# Patient Record
Sex: MALE | Race: WHITE
[De-identification: names, ages, dates, MRNs, and addresses within clinical notes are randomized per-mention and may not be internally consistent; named-entity substitution may affect disease eponyms.]

---

## 2023-05-28 ENCOUNTER — HOSPITAL ENCOUNTER (EMERGENCY)
Dept: HOSPITAL 75 - ER | Age: 23
Discharge: HOME | End: 2023-05-28
Payer: COMMERCIAL

## 2023-05-28 VITALS — SYSTOLIC BLOOD PRESSURE: 123 MMHG | DIASTOLIC BLOOD PRESSURE: 82 MMHG

## 2023-05-28 DIAGNOSIS — Z28.311: ICD-10-CM

## 2023-05-28 DIAGNOSIS — R06.02: ICD-10-CM

## 2023-05-28 DIAGNOSIS — K08.89: Primary | ICD-10-CM

## 2023-05-28 DIAGNOSIS — Z20.822: ICD-10-CM

## 2023-05-28 DIAGNOSIS — R51.9: ICD-10-CM

## 2023-05-28 LAB
ALBUMIN SERPL-MCNC: 4.5 GM/DL (ref 3.2–4.5)
ALP SERPL-CCNC: 69 U/L (ref 40–136)
ALT SERPL-CCNC: 23 U/L (ref 0–55)
AMORPH SED URNS QL MICRO: (no result) /LPF
APTT PPP: YELLOW S
BACTERIA #/AREA URNS HPF: NEGATIVE /HPF
BASOPHILS # BLD AUTO: 0.1 10^3/UL (ref 0–0.1)
BASOPHILS NFR BLD AUTO: 1 % (ref 0–10)
BILIRUB SERPL-MCNC: 0.5 MG/DL (ref 0.1–1)
BILIRUB UR QL STRIP: NEGATIVE
BUN/CREAT SERPL: 11
CALCIUM SERPL-MCNC: 9.9 MG/DL (ref 8.5–10.1)
CHLORIDE SERPL-SCNC: 105 MMOL/L (ref 98–107)
CO2 SERPL-SCNC: 25 MMOL/L (ref 21–32)
CREAT SERPL-MCNC: 1 MG/DL (ref 0.6–1.3)
EOSINOPHIL # BLD AUTO: 0.2 10^3/UL (ref 0–0.3)
EOSINOPHIL NFR BLD AUTO: 2 % (ref 0–10)
FIBRINOGEN PPP-MCNC: CLEAR MG/DL
GFR SERPLBLD BASED ON 1.73 SQ M-ARVRAT: 108 ML/MIN
GLUCOSE SERPL-MCNC: 95 MG/DL (ref 70–105)
GLUCOSE UR STRIP-MCNC: NEGATIVE MG/DL
HCT VFR BLD CALC: 43 % (ref 35–52)
HGB BLD-MCNC: 15 G/DL (ref 11.5–16)
KETONES UR QL STRIP: (no result)
LEUKOCYTE ESTERASE UR QL STRIP: NEGATIVE
LIPASE SERPL-CCNC: 36 U/L (ref 8–78)
LYMPHOCYTES # BLD AUTO: 3.1 10^3/UL (ref 1–4)
LYMPHOCYTES NFR BLD AUTO: 23 % (ref 12–44)
MANUAL DIFFERENTIAL PERFORMED BLD QL: NO
MCH RBC QN AUTO: 31 PG (ref 25–34)
MCHC RBC AUTO-ENTMCNC: 35 G/DL (ref 32–36)
MCV RBC AUTO: 89 FL (ref 80–99)
MONOCYTES # BLD AUTO: 0.7 10^3/UL (ref 0–1)
MONOCYTES NFR BLD AUTO: 5 % (ref 0–12)
NEUTROPHILS # BLD AUTO: 9.1 10^3/UL (ref 1.8–7.8)
NEUTROPHILS NFR BLD AUTO: 69 % (ref 42–75)
NITRITE UR QL STRIP: NEGATIVE
PH UR STRIP: 7 [PH] (ref 5–9)
PLATELET # BLD: 345 10^3/UL (ref 130–400)
PMV BLD AUTO: 10.2 FL (ref 9–12.2)
POTASSIUM SERPL-SCNC: 4.1 MMOL/L (ref 3.6–5)
PROT SERPL-MCNC: 7.7 GM/DL (ref 6.4–8.2)
PROT UR QL STRIP: (no result)
RBC #/AREA URNS HPF: (no result) /HPF
SODIUM SERPL-SCNC: 142 MMOL/L (ref 135–145)
SP GR UR STRIP: 1.02 (ref 1.02–1.02)
WBC # BLD AUTO: 13.3 10^3/UL (ref 4.3–11)
WBC #/AREA URNS HPF: (no result) /HPF

## 2023-05-28 PROCEDURE — 83690 ASSAY OF LIPASE: CPT

## 2023-05-28 PROCEDURE — 87636 SARSCOV2 & INF A&B AMP PRB: CPT

## 2023-05-28 PROCEDURE — 85025 COMPLETE CBC W/AUTO DIFF WBC: CPT

## 2023-05-28 PROCEDURE — 81000 URINALYSIS NONAUTO W/SCOPE: CPT

## 2023-05-28 PROCEDURE — 80053 COMPREHEN METABOLIC PANEL: CPT

## 2023-05-28 PROCEDURE — 86141 C-REACTIVE PROTEIN HS: CPT

## 2023-05-28 PROCEDURE — 36415 COLL VENOUS BLD VENIPUNCTURE: CPT

## 2023-05-28 NOTE — ED GENERAL
General


Chief Complaint:  General Problems/Pain


Stated Complaint:  FEVER


Nursing Triage Note:  


PT AMB TO [facility] 5 ACCOMPAINED BY EMS WITH CC OF FEVER, DIZZINESS AND INDGESTION X 1




DAY. PT UNK HOW HIGH FEVER HAS BEEN. PT DENIES ABD PAIN NAUSEA AND VOMITING.


Source of Information:  Patient


Exam Limitations:  No Limitations





History of Present Illness


Date Seen by Provider:  May 28, 2023


Time Seen by Provider:  20:38


Initial Comments


Patient is a 23-year-old male who presents to ED with feeling warm, dizzy, 

indigestion, facial warmness.  Symptoms over the past few days of feeling dizzy.

 States he feels off balance.  Denies room spinning, ear pain, hearing loss.  

States 1 hour ago he started feeling short of breath.  He states his left and 

right face feels warm.  Denies of any visual changes, unilateral muscle weakness

or sensory changes.  Reports some indigestion type symptoms secondary to this 

warmth on his face.  He is concerned that his wisdom teeth are infected.  

Patient has seen a dentist and they are recommending pulling his wisdom teeth in

4 months.  Denies headache, chest pain, cough, abdominal pain.  Reports some 

diarrhea since yesterday.  Denies any vomiting.  No current abdominal pain, 

dysuria, hematuria.





Allergies and Home Medications


Allergies


Coded Allergies:  


     No Known Drug Allergies (Unverified , 5/28/23)





Patient Home Medication List


Home Medication List Reviewed:  Yes


Amoxicillin/Potassium Clav (Amox Tr-K Clv 875-125 mg Tab) 875 Mg-125 Mg Tablet, 

1 EACH PO BID


   Prescribed by: MAIRA BURKETT on 5/28/23 7236





Review of Systems


Review of Systems


Constitutional:  No chills, No diaphoresis; dizziness, fever, malaise, weakness


EENTM:  No ear pain, No blurred vision, No double vision


Respiratory:  No cough, No dyspnea on exertion; short of breath


Cardiovascular:  No chest pain, No edema


Gastrointestinal:  No abdominal pain, No constipation, No diarrhea; nausea, 

vomiting


Genitourinary:  No decreased output, No discharge


Musculoskeletal:  No back pain, No joint pain





All Other Systems Reviewed


Negative Unless Noted:  Yes





Past Medical-Social-Family Hx


Patient Social History


Tobacco Use?:  No


Substance use?:  No


Alcohol Use?:  Yes


Alcohol Frequency:  Once in a while


Pt feels they are or have been:  No





Physical Exam


Vital Signs





Vital Signs - First Documented








 5/28/23





 20:12


 


Temp 37.4


 


Pulse 96


 


Resp 18


 


B/P (MAP) 137/74 (95)


 


Pulse Ox 100





Capillary Refill : Less Than 3 Seconds


Height, Weight, BMI


Height: '"


Weight: lbs. oz. kg;  BMI


Method:


General Appearance:  No Apparent Distress, WD/WN


Eyes:  Bilateral Eye Normal Inspection, Bilateral Eye PERRL, Bilateral Eye EOMI


HEENT:  PERRL/EOMI, TMs Normal, Normal ENT Inspection, Pharynx Normal, Other 

(Bilateral upper wisdom teeth noted with surrounding gum swelling and redness.  

No function of mass)


Neck:  Full Range of Motion, Normal Inspection, Non Tender


Respiratory:  Chest Non Tender, Lungs Clear, Normal Breath Sounds, No Accessory 

Muscle Use


Cardiovascular:  Regular Rate, Rhythm, No Edema, No Gallop, No JVD


Gastrointestinal:  Normal Bowel Sounds, No Organomegaly, No Pulsatile Mass


Rectal:  Normal Exam


Back:  Normal Inspection, No CVA Tenderness, No Vertebral Tenderness


Extremity:  Normal Capillary Refill, Normal Inspection, Normal Range of Motion, 

Non Tender


Neurologic/Psychiatric:  Alert, Oriented x3, No Motor/Sensory Deficits, Normal 

Mood/Affect, CNs II-XII Norm as Tested


Skin:  Normal Color, Warm/Dry





Progress/Results/Core Measures


Suspected Sepsis


SIRS


Temperature: 


Pulse: 96 


Respiratory Rate: 18


 


Laboratory Tests


5/28/23 20:49: White Blood Count 13.3H


Blood Pressure 137 /74 


Mean: 95


 





Laboratory Tests


5/28/23 20:49: 


Creatinine 1.00, Platelet Count 345, Total Bilirubin 0.5








Results/Orders


Lab Results





Laboratory Tests








Test


 5/28/23


20:40 5/28/23


20:49 5/28/23


20:54 Range/Units


 


 


Urine Color YELLOW     


 


Urine Clarity CLEAR     


 


Urine pH 7.0    5-9  


 


Urine Specific Gravity 1.020    1.016-1.022  


 


Urine Protein 1+ H   NEGATIVE  


 


Urine Glucose (UA) NEGATIVE    NEGATIVE  


 


Urine Ketones TRACE H   NEGATIVE  


 


Urine Nitrite NEGATIVE    NEGATIVE  


 


Urine Bilirubin NEGATIVE    NEGATIVE  


 


Urine Urobilinogen 1.0    < = 1.0  MG/DL


 


Urine Leukocyte Esterase NEGATIVE    NEGATIVE  


 


Urine RBC (Auto) NEGATIVE    NEGATIVE  


 


Urine RBC NONE     /HPF


 


Urine WBC NONE     /HPF


 


Urine Crystals PRESENT H    /LPF


 


Urine Amorphous Sediment


 MOD MAEGAN


PHOSPHATE H 


 


  /LPF





 


Urine Bacteria NEGATIVE     /HPF


 


Urine Casts NONE     /LPF


 


Urine Mucus NEGATIVE     /LPF


 


Urine Culture Indicated NO     


 


White Blood Count


 


 13.3 H


 


 4.3-11.0


10^3/uL


 


Red Blood Count


 


 4.85 


 


 3.80-5.11


10^6/uL


 


Hemoglobin  15.0   11.5-16.0  g/dL


 


Hematocrit  43   35-52  %


 


Mean Corpuscular Volume  89   80-99  fL


 


Mean Corpuscular Hemoglobin  31   25-34  pg


 


Mean Corpuscular Hemoglobin


Concent 


 35 


 


 32-36  g/dL





 


Red Cell Distribution Width  11.9   10.0-14.5  %


 


Platelet Count


 


 345 


 


 130-400


10^3/uL


 


Mean Platelet Volume  10.2   9.0-12.2  fL


 


Immature Granulocyte % (Auto)  0    %


 


Neutrophils (%) (Auto)  69   42-75  %


 


Lymphocytes (%) (Auto)  23   12-44  %


 


Monocytes (%) (Auto)  5   0-12  %


 


Eosinophils (%) (Auto)  2   0-10  %


 


Basophils (%) (Auto)  1   0-10  %


 


Neutrophils # (Auto)


 


 9.1 H


 


 1.8-7.8


10^3/uL


 


Lymphocytes # (Auto)


 


 3.1 


 


 1.0-4.0


10^3/uL


 


Monocytes # (Auto)


 


 0.7 


 


 0.0-1.0


10^3/uL


 


Eosinophils # (Auto)


 


 0.2 


 


 0.0-0.3


10^3/uL


 


Basophils # (Auto)


 


 0.1 


 


 0.0-0.1


10^3/uL


 


Immature Granulocyte # (Auto)


 


 0.0 


 


 0.0-0.1


10^3/uL


 


Sodium Level  142   135-145  MMOL/L


 


Potassium Level  4.1   3.6-5.0  MMOL/L


 


Chloride Level  105     MMOL/L


 


Carbon Dioxide Level  25   21-32  MMOL/L


 


Anion Gap  12   5-14  MMOL/L


 


Blood Urea Nitrogen  11   7-18  MG/DL


 


Creatinine


 


 1.00 


 


 0.60-1.30


MG/DL


 


Estimat Glomerular Filtration


Rate 


 108 


 


  





 


BUN/Creatinine Ratio  11    


 


Glucose Level  95     MG/DL


 


Calcium Level  9.9   8.5-10.1  MG/DL


 


Corrected Calcium  9.5   8.5-10.1  MG/DL


 


Total Bilirubin  0.5   0.1-1.0  MG/DL


 


Aspartate Amino Transf


(AST/SGOT) 


 16 


 


 5-34  U/L





 


Alanine Aminotransferase


(ALT/SGPT) 


 23 


 


 0-55  U/L





 


Alkaline Phosphatase  69     U/L


 


C-Reactive Protein High


Sensitivity 


 0.42 


 


 0.00-0.50


MG/DL


 


Total Protein  7.7   6.4-8.2  GM/DL


 


Albumin  4.5   3.2-4.5  GM/DL


 


Lipase  36   8-78  U/L


 


Influenza Type A (RT-PCR)   Not Detected  Not Detecte  


 


Influenza Type B (RT-PCR)   Not Detected  Not Detecte  


 


SARS-CoV-2 RNA (RT-PCR)   Not Detected  Not Detecte  








My Orders





Orders - NOEL FELDMAN PA


Ua Culture If Indicated (5/28/23 20:27)


Cbc With Automated Diff (5/28/23 20:37)


Comprehensive Metabolic Panel (5/28/23 20:37)


Lipase (5/28/23 20:37)


Hs C Reactive Protein (5/28/23 20:37)


Covid 19 Inhouse Test (5/28/23 20:37)


Influenza A And B By Pcr (5/28/23 20:37)


Amoxicillin Capsule (Polymox Capsule) (5/28/23 21:40)


Amoxicillin/Clavulanate Tablet (Augmenti (5/28/23 21:43)





Vital Signs/I&O











 5/28/23 5/28/23





 20:12 21:54


 


Temp 37.4 


 


Pulse 96 95


 


Resp 18 18


 


B/P (MAP) 137/74 (95) 123/82


 


Pulse Ox 100 97





Capillary Refill : Less Than 3 Seconds








Blood Pressure Mean:                    95











Departure


Communication (PCP)


Patient with multiple complaints.  Patient denies of any specific pain.  Reports

bilateral upper facial warmness.  States he is scheduled to get his wisdom teeth

removed in 4 months.  He is concerned that this is getting worse.  Sensitivity 

to his upper wisdom teeth.  Patient with associated headache.  Took ibuprofen 

with improvement.  No vomiting or diarrhea.  Denies of any specific abdominal 

pain or urinary symptoms.  Reports some shortness of breath started 1 hour ago 

without chest pain or cough.  No known cardiac history.  Lung sounds clear 

bilateral.  Denies cough or wheezing.  He is not hypoxic or febrile.  Soft abdom

en without any tenderness.  general lab work CBC, CMP, urinalysis, COVID 

influenza was ordered.  CBC showed a white blood count of 13 but otherwise 

grossly unremarkable.  Chemistry grossly unremarkable.  Urinalysis negative for 

infection.  COVID influenza negative.  Patient does not appear in acute 

distress.  Due to the dental discomfort will discharge with Augmentin for 

potential underlying dental infection.  No facial swelling or redness.  Very 

minimal gum erythema and swelling.  Patient was given a dose of augmentin here. 

Recommend follow-up with your dentist.  Nonspecific dizziness.  Nonspecific 

short of breath.  No specific chest pain.  No focal neural deficits or 

neurological red flag findings suggesting further imaging of the head.  

Recommend outpatient follow-up with your PCP in 2 to 3 days for reevaluation.  

If any worsening symptoms return back to ED





Impression





   Primary Impression:  


   Pain, dental


Disposition:  01 HOME, SELF-CARE


Condition:  Stable





Departure-Patient Inst.


Decision time for Depature:  21:45


Referrals:  


[facility] [place]





JOHN,LOCAL PHYSICIAN (PCP)


Primary Care Physician


Patient Instructions:  Dental Pain ED





Add. Discharge Instructions:  


Recommend following up with your dentist.  If any worsening symptoms return back

to ED.





All discharge instructions reviewed with patient and/or family. Voiced u

nderstanding.


Scripts


Amoxicillin/Potassium Clav (Amox Tr-K Clv 875-125 mg Tab) 875 Mg-125 Mg Tablet


1 EACH PO BID for 7 Days, #14 TAB


   Prov: NOEL FELDMAN         5/28/23











NOEL FELDMAN          May 28, 2023 20:41